# Patient Record
Sex: MALE | Race: WHITE | NOT HISPANIC OR LATINO | Employment: FULL TIME | URBAN - METROPOLITAN AREA
[De-identification: names, ages, dates, MRNs, and addresses within clinical notes are randomized per-mention and may not be internally consistent; named-entity substitution may affect disease eponyms.]

---

## 2023-03-30 PROBLEM — J34.2 NASAL SEPTAL DEVIATION: Status: ACTIVE | Noted: 2023-03-30

## 2023-03-30 PROBLEM — S83.242A TEAR OF MEDIAL MENISCUS OF LEFT KNEE, CURRENT: Status: ACTIVE | Noted: 2023-03-30

## 2023-03-30 PROBLEM — S63.636A SPRAIN OF INTERPHALANGEAL JOINT OF RIGHT LITTLE FINGER: Status: ACTIVE | Noted: 2023-03-30

## 2023-03-30 PROBLEM — M25.569 KNEE PAIN: Status: ACTIVE | Noted: 2023-03-30

## 2023-03-30 PROBLEM — J30.9 ALLERGIC RHINITIS: Status: ACTIVE | Noted: 2023-03-30

## 2023-03-30 PROBLEM — M43.06 LUMBAR SPONDYLOLYSIS: Status: ACTIVE | Noted: 2023-03-30

## 2023-03-30 PROBLEM — H69.90 EUSTACHIAN TUBE DYSFUNCTION: Status: ACTIVE | Noted: 2023-03-30

## 2023-03-30 RX ORDER — FLUTICASONE PROPIONATE 50 MCG
SPRAY, SUSPENSION (ML) NASAL 2 TIMES DAILY
COMMUNITY
Start: 2013-08-26

## 2023-03-30 RX ORDER — CYCLOBENZAPRINE HCL 10 MG
1 TABLET ORAL 2 TIMES DAILY PRN
COMMUNITY
Start: 2019-10-07 | End: 2023-03-31 | Stop reason: SDUPTHER

## 2023-03-31 ENCOUNTER — OFFICE VISIT (OUTPATIENT)
Dept: PRIMARY CARE | Facility: CLINIC | Age: 43
End: 2023-03-31
Payer: COMMERCIAL

## 2023-03-31 VITALS
HEART RATE: 73 BPM | SYSTOLIC BLOOD PRESSURE: 157 MMHG | OXYGEN SATURATION: 95 % | WEIGHT: 187 LBS | BODY MASS INDEX: 29.35 KG/M2 | DIASTOLIC BLOOD PRESSURE: 71 MMHG | TEMPERATURE: 97.6 F | RESPIRATION RATE: 16 BRPM | HEIGHT: 67 IN

## 2023-03-31 DIAGNOSIS — M62.838 CERVICAL PARASPINAL MUSCLE SPASM: Primary | ICD-10-CM

## 2023-03-31 DIAGNOSIS — Z00.00 HEALTHCARE MAINTENANCE: ICD-10-CM

## 2023-03-31 DIAGNOSIS — M54.12 CERVICAL RADICULOPATHY: ICD-10-CM

## 2023-03-31 DIAGNOSIS — R03.0 ELEVATED BLOOD PRESSURE READING: ICD-10-CM

## 2023-03-31 PROCEDURE — 1036F TOBACCO NON-USER: CPT | Performed by: STUDENT IN AN ORGANIZED HEALTH CARE EDUCATION/TRAINING PROGRAM

## 2023-03-31 PROCEDURE — 99213 OFFICE O/P EST LOW 20 MIN: CPT | Performed by: STUDENT IN AN ORGANIZED HEALTH CARE EDUCATION/TRAINING PROGRAM

## 2023-03-31 RX ORDER — CYCLOBENZAPRINE HCL 10 MG
10 TABLET ORAL 2 TIMES DAILY PRN
Qty: 30 TABLET | Refills: 0 | Status: SHIPPED | OUTPATIENT
Start: 2023-03-31 | End: 2023-04-15

## 2023-03-31 RX ORDER — METHYLPREDNISOLONE 4 MG/1
TABLET ORAL
Qty: 21 TABLET | Refills: 0 | Status: SHIPPED | OUTPATIENT
Start: 2023-03-31 | End: 2023-04-07

## 2023-03-31 ASSESSMENT — ENCOUNTER SYMPTOMS
NECK PAIN: 1
SHORTNESS OF BREATH: 0
FEVER: 0
CHILLS: 0
NAUSEA: 0
DIAPHORESIS: 0
VOMITING: 0
NUMBNESS: 1

## 2023-03-31 ASSESSMENT — PATIENT HEALTH QUESTIONNAIRE - PHQ9
SUM OF ALL RESPONSES TO PHQ9 QUESTIONS 1 AND 2: 0
2. FEELING DOWN, DEPRESSED OR HOPELESS: NOT AT ALL
1. LITTLE INTEREST OR PLEASURE IN DOING THINGS: NOT AT ALL

## 2023-03-31 ASSESSMENT — PAIN SCALES - GENERAL: PAINLEVEL: 0-NO PAIN

## 2023-03-31 NOTE — ASSESSMENT & PLAN NOTE
Suspect this is related to his pain as he appears to be in moderate amount of pain today related to chief complaint.  Can recheck at next appointment with PCP.

## 2023-03-31 NOTE — PROGRESS NOTES
"Subjective   Patient ID: Sherman Bean is a 42 y.o. male who presents for pinch nerve.  He is here for concerns of pinched nerve in the neck. Symptoms started Monday when he was lifting and doing a standing  press. Pain is present throughout the day. He's tried tylenol, advil, lidocaine patches, cyclobenzaprine. [facility] and [facility] prednisone burst 20mg/day x5 days which he didn't feel much benefit from. Numbness present down right arm into the thumb, index finger, and middle finger. Trouble sleeping due to pain. No urinary/fecal incontinence, no leg weakness. Right arm feels weak. This last happened about 2 years ago.         Review of Systems   Constitutional:  Negative for chills, diaphoresis and fever.   HENT:  Negative for hearing loss.    Eyes:  Negative for visual disturbance.   Respiratory:  Negative for shortness of breath.    Cardiovascular:  Negative for chest pain.   Gastrointestinal:  Negative for nausea and vomiting.   Musculoskeletal:  Positive for neck pain.   Skin:  Negative for rash.   Neurological:  Positive for numbness.       /71 (BP Location: Left arm, Patient Position: Sitting, BP Cuff Size: Adult)   Pulse 73   Temp 36.4 °C (97.6 °F) (Temporal)   Resp 16   Ht 1.702 m (5' 7\")   Wt 84.8 kg (187 lb)   SpO2 95%   BMI 29.29 kg/m²     Objective   Physical Exam  Vitals reviewed.   Constitutional:       General: He is not in acute distress.     Appearance: Normal appearance.   HENT:      Head: Normocephalic.   Cardiovascular:      Rate and Rhythm: Normal rate and regular rhythm.      Pulses: Normal pulses.   Pulmonary:      Effort: Pulmonary effort is normal. No respiratory distress.      Breath sounds: Normal breath sounds.   Abdominal:      General: There is no distension.   Musculoskeletal:         General: No deformity.      Comments: No midline spinal tenderness present.  There is bilateral paraspinal tenderness and hypertonicity in the cervical region as " well as upper thoracic.  Strength 5 out of 5 in the bilateral upper extremities   Skin:     Coloration: Skin is not jaundiced.   Neurological:      Mental Status: He is alert.      Comments: Mild sensation deficit to the right lateral upper extremity into the first 3 digits.         Assessment/Plan   Problem List Items Addressed This Visit          Nervous    Cervical radiculopathy     Symptoms seem most consistent with cervical radiculopathy caused by cervical muscle spasm.  Will treat with Medrol Dosepak and Flexeril 10 mg twice a day as needed.  Referred him to sports medicine for further evaluation and management.  Recommended x-ray of the cervical spine to evaluate for any injury any politely declined today.  Follow-up with me as needed.  Advised that he schedule a follow-up physical exam with Dr. Buenrostro to have prior to leaving today.         Relevant Medications    methylPREDNISolone (Medrol Dospak) 4 mg tablets    cyclobenzaprine (Flexeril) 10 mg tablet    Other Relevant Orders    Referral to Sports Medicine       Circulatory    Elevated blood pressure reading     Suspect this is related to his pain as he appears to be in moderate amount of pain today related to chief complaint.  Can recheck at next appointment with PCP.            Musculoskeletal    Cervical paraspinal muscle spasm - Primary    Relevant Medications    methylPREDNISolone (Medrol Dospak) 4 mg tablets    cyclobenzaprine (Flexeril) 10 mg tablet    Other Relevant Orders    Referral to Sports Medicine       Other    Healthcare maintenance    Relevant Orders    Follow Up In [facility] - PCP

## 2023-03-31 NOTE — ASSESSMENT & PLAN NOTE
Symptoms seem most consistent with cervical radiculopathy caused by cervical muscle spasm.  Will treat with Medrol Dosepak and Flexeril 10 mg twice a day as needed.  Referred him to sports medicine for further evaluation and management.  Recommended x-ray of the cervical spine to evaluate for any injury any politely declined today.  Follow-up with me as needed.  Advised that he schedule a follow-up physical exam with Dr. Buenrostro to have prior to leaving today.

## 2023-05-01 ENCOUNTER — APPOINTMENT (OUTPATIENT)
Dept: PRIMARY CARE | Facility: CLINIC | Age: 43
End: 2023-05-01
Payer: COMMERCIAL